# Patient Record
Sex: FEMALE | Race: WHITE | NOT HISPANIC OR LATINO | Employment: OTHER | URBAN - NONMETROPOLITAN AREA
[De-identification: names, ages, dates, MRNs, and addresses within clinical notes are randomized per-mention and may not be internally consistent; named-entity substitution may affect disease eponyms.]

---

## 2023-03-14 PROBLEM — M54.14 THORACIC RADICULOPATHY: Status: ACTIVE | Noted: 2023-03-14

## 2023-03-28 PROBLEM — I10 BENIGN ESSENTIAL HTN: Status: ACTIVE | Noted: 2023-03-28

## 2023-03-28 PROBLEM — E87.20 METABOLIC ACIDEMIA: Status: ACTIVE | Noted: 2023-03-28

## 2023-03-28 PROBLEM — F33.1 MDD (MAJOR DEPRESSIVE DISORDER), RECURRENT EPISODE, MODERATE: Status: ACTIVE | Noted: 2023-03-28

## 2023-03-28 PROBLEM — D72.829 LEUKOCYTOSIS: Status: ACTIVE | Noted: 2023-03-28

## 2023-03-28 PROBLEM — Z79.899 POLYPHARMACY: Status: ACTIVE | Noted: 2023-03-28

## 2024-03-21 ENCOUNTER — HOSPITAL ENCOUNTER (INPATIENT)
Facility: HOSPITAL | Age: 39
LOS: 2 days | Discharge: HOME | DRG: 883 | End: 2024-03-23
Attending: STUDENT IN AN ORGANIZED HEALTH CARE EDUCATION/TRAINING PROGRAM | Admitting: STUDENT IN AN ORGANIZED HEALTH CARE EDUCATION/TRAINING PROGRAM
Payer: MEDICARE

## 2024-03-21 DIAGNOSIS — R45.851 SUICIDAL IDEATIONS: ICD-10-CM

## 2024-03-21 PROCEDURE — 11400000 HC PSYCH PRIVATE ROOM

## 2024-03-21 PROCEDURE — 25000003 PHARM REV CODE 250: Performed by: PSYCHIATRY & NEUROLOGY

## 2024-03-21 RX ORDER — HYDROXYZINE PAMOATE 50 MG/1
50 CAPSULE ORAL NIGHTLY PRN
Status: DISCONTINUED | OUTPATIENT
Start: 2024-03-21 | End: 2024-03-22

## 2024-03-21 RX ORDER — ACETAMINOPHEN 325 MG/1
650 TABLET ORAL EVERY 6 HOURS PRN
Status: DISCONTINUED | OUTPATIENT
Start: 2024-03-21 | End: 2024-03-22

## 2024-03-21 RX ADMIN — ACETAMINOPHEN 650 MG: 325 TABLET ORAL at 10:03

## 2024-03-21 RX ADMIN — HYDROXYZINE PAMOATE 50 MG: 50 CAPSULE ORAL at 10:03

## 2024-03-22 PROBLEM — R45.851 SUICIDAL IDEATIONS: Status: ACTIVE | Noted: 2024-03-22

## 2024-03-22 PROBLEM — E66.01 CLASS 3 SEVERE OBESITY DUE TO EXCESS CALORIES WITHOUT SERIOUS COMORBIDITY IN ADULT: Status: ACTIVE | Noted: 2024-03-22

## 2024-03-22 LAB
CHOLEST SERPL-MCNC: 187 MG/DL (ref 120–199)
CHOLEST/HDLC SERPL: 4.1 {RATIO} (ref 2–5)
ESTIMATED AVG GLUCOSE: 120 MG/DL (ref 68–131)
HBA1C MFR BLD: 5.8 % (ref 4–5.6)
HDLC SERPL-MCNC: 46 MG/DL (ref 40–75)
HDLC SERPL: 24.6 % (ref 20–50)
LDLC SERPL CALC-MCNC: 111.8 MG/DL (ref 63–159)
NONHDLC SERPL-MCNC: 141 MG/DL
TRIGL SERPL-MCNC: 146 MG/DL (ref 30–150)

## 2024-03-22 PROCEDURE — 25000003 PHARM REV CODE 250: Performed by: PSYCHIATRY & NEUROLOGY

## 2024-03-22 PROCEDURE — 36415 COLL VENOUS BLD VENIPUNCTURE: CPT | Performed by: PSYCHIATRY & NEUROLOGY

## 2024-03-22 PROCEDURE — 80061 LIPID PANEL: CPT | Performed by: PSYCHIATRY & NEUROLOGY

## 2024-03-22 PROCEDURE — 99223 1ST HOSP IP/OBS HIGH 75: CPT | Mod: ,,, | Performed by: PSYCHIATRY & NEUROLOGY

## 2024-03-22 PROCEDURE — 90836 PSYTX W PT W E/M 45 MIN: CPT | Mod: ,,, | Performed by: PSYCHIATRY & NEUROLOGY

## 2024-03-22 PROCEDURE — 25000003 PHARM REV CODE 250: Performed by: INTERNAL MEDICINE

## 2024-03-22 PROCEDURE — 83036 HEMOGLOBIN GLYCOSYLATED A1C: CPT | Performed by: PSYCHIATRY & NEUROLOGY

## 2024-03-22 PROCEDURE — 11400000 HC PSYCH PRIVATE ROOM

## 2024-03-22 RX ORDER — ONDANSETRON 4 MG/1
4 TABLET, ORALLY DISINTEGRATING ORAL EVERY 8 HOURS PRN
Status: DISCONTINUED | OUTPATIENT
Start: 2024-03-22 | End: 2024-03-23 | Stop reason: HOSPADM

## 2024-03-22 RX ORDER — OLANZAPINE 10 MG/2ML
10 INJECTION, POWDER, FOR SOLUTION INTRAMUSCULAR EVERY 8 HOURS PRN
Status: DISCONTINUED | OUTPATIENT
Start: 2024-03-22 | End: 2024-03-23 | Stop reason: HOSPADM

## 2024-03-22 RX ORDER — BENZTROPINE MESYLATE 1 MG/ML
2 INJECTION, SOLUTION INTRAMUSCULAR; INTRAVENOUS EVERY 8 HOURS PRN
Status: DISCONTINUED | OUTPATIENT
Start: 2024-03-22 | End: 2024-03-23 | Stop reason: HOSPADM

## 2024-03-22 RX ORDER — ACETAMINOPHEN 325 MG/1
650 TABLET ORAL EVERY 6 HOURS PRN
Status: DISCONTINUED | OUTPATIENT
Start: 2024-03-22 | End: 2024-03-23 | Stop reason: HOSPADM

## 2024-03-22 RX ORDER — OXYCODONE AND ACETAMINOPHEN 7.5; 325 MG/1; MG/1
1 TABLET ORAL 2 TIMES DAILY
Status: DISCONTINUED | OUTPATIENT
Start: 2024-03-22 | End: 2024-03-23 | Stop reason: HOSPADM

## 2024-03-22 RX ORDER — LOPERAMIDE HYDROCHLORIDE 2 MG/1
2 CAPSULE ORAL
Status: DISCONTINUED | OUTPATIENT
Start: 2024-03-22 | End: 2024-03-23 | Stop reason: HOSPADM

## 2024-03-22 RX ORDER — GABAPENTIN 300 MG/1
300 CAPSULE ORAL 3 TIMES DAILY
COMMUNITY
Start: 2024-03-13

## 2024-03-22 RX ORDER — ALUMINUM HYDROXIDE, MAGNESIUM HYDROXIDE, AND SIMETHICONE 1200; 120; 1200 MG/30ML; MG/30ML; MG/30ML
30 SUSPENSION ORAL EVERY 6 HOURS PRN
Status: DISCONTINUED | OUTPATIENT
Start: 2024-03-22 | End: 2024-03-23 | Stop reason: HOSPADM

## 2024-03-22 RX ORDER — GABAPENTIN 300 MG/1
300 CAPSULE ORAL 3 TIMES DAILY
Status: DISCONTINUED | OUTPATIENT
Start: 2024-03-22 | End: 2024-03-23 | Stop reason: HOSPADM

## 2024-03-22 RX ORDER — HYDROXYZINE PAMOATE 50 MG/1
50 CAPSULE ORAL EVERY 6 HOURS PRN
Status: DISCONTINUED | OUTPATIENT
Start: 2024-03-22 | End: 2024-03-23 | Stop reason: HOSPADM

## 2024-03-22 RX ORDER — IBUPROFEN 200 MG
1 TABLET ORAL DAILY PRN
Status: DISCONTINUED | OUTPATIENT
Start: 2024-03-22 | End: 2024-03-23 | Stop reason: HOSPADM

## 2024-03-22 RX ORDER — OXYCODONE AND ACETAMINOPHEN 7.5; 325 MG/1; MG/1
1 TABLET ORAL 2 TIMES DAILY PRN
COMMUNITY
Start: 2024-03-15 | End: 2024-05-11

## 2024-03-22 RX ORDER — MULTIVITAMIN
1 TABLET ORAL DAILY
COMMUNITY

## 2024-03-22 RX ORDER — OLANZAPINE 10 MG/1
10 TABLET ORAL EVERY 8 HOURS PRN
Status: DISCONTINUED | OUTPATIENT
Start: 2024-03-22 | End: 2024-03-23 | Stop reason: HOSPADM

## 2024-03-22 RX ORDER — CLONIDINE HYDROCHLORIDE 0.1 MG/1
0.1 TABLET ORAL DAILY
Status: ON HOLD | COMMUNITY
End: 2024-03-22

## 2024-03-22 RX ORDER — CYCLOBENZAPRINE HCL 5 MG
20 TABLET ORAL 3 TIMES DAILY PRN
Status: DISCONTINUED | OUTPATIENT
Start: 2024-03-22 | End: 2024-03-23 | Stop reason: HOSPADM

## 2024-03-22 RX ORDER — BENZONATATE 100 MG/1
100 CAPSULE ORAL 3 TIMES DAILY PRN
Status: DISCONTINUED | OUTPATIENT
Start: 2024-03-22 | End: 2024-03-23 | Stop reason: HOSPADM

## 2024-03-22 RX ORDER — IBUPROFEN 400 MG/1
800 TABLET ORAL 3 TIMES DAILY
Status: DISCONTINUED | OUTPATIENT
Start: 2024-03-22 | End: 2024-03-23 | Stop reason: HOSPADM

## 2024-03-22 RX ORDER — PROMETHAZINE HYDROCHLORIDE 25 MG/1
25 TABLET ORAL EVERY 6 HOURS PRN
Status: DISCONTINUED | OUTPATIENT
Start: 2024-03-22 | End: 2024-03-23 | Stop reason: HOSPADM

## 2024-03-22 RX ORDER — IBUPROFEN 800 MG/1
800 TABLET ORAL 3 TIMES DAILY
COMMUNITY
Start: 2024-02-28

## 2024-03-22 RX ADMIN — CYCLOBENZAPRINE HYDROCHLORIDE 20 MG: 5 TABLET, FILM COATED ORAL at 05:03

## 2024-03-22 RX ADMIN — OXYCODONE AND ACETAMINOPHEN 1 TABLET: 7.5; 325 TABLET ORAL at 10:03

## 2024-03-22 RX ADMIN — GABAPENTIN 300 MG: 300 CAPSULE ORAL at 08:03

## 2024-03-22 RX ADMIN — IBUPROFEN 800 MG: 400 TABLET, FILM COATED ORAL at 08:03

## 2024-03-22 RX ADMIN — OXYCODONE AND ACETAMINOPHEN 1 TABLET: 7.5; 325 TABLET ORAL at 08:03

## 2024-03-22 RX ADMIN — ACETAMINOPHEN 650 MG: 325 TABLET ORAL at 08:03

## 2024-03-22 RX ADMIN — GABAPENTIN 300 MG: 300 CAPSULE ORAL at 03:03

## 2024-03-22 NOTE — PLAN OF CARE
03/22/24 1450   Step 1: Warning Signs   Warning Sign 1 anxiety/anxiety attack   Warning Sign 2 chroni pain   Warning Sign 3 being sedated   Step 2: Internal coping strategies - Things I can do to take my mind off my problems without contacting another person:   Coping Strategy 1 crotchet   Coping Strategy 2 build minature model homes   Coping Strategy 3 n/a   Step 3: People and social settings that provide distraction:   1. Name My Kids (abdi, nithya, sherry, bardales)       Phone n/a   2. Name severo Sharma'       Phone [phone]   3. Place my porch   4. Place n/a    Step 4: People whom I can ask for help:   1. Person Soco Villegas (fiance)       Phone [phone]   2. Person Betty (mother)       Phone [phone]   3. Person Juanita (severo' mother)   Step 5: Professionals or agencies I can contact during a crisis:   1. Clinician Name [facility]       Phone [phone]   3. Suicide Prevention Lifeline: 988 Suicide Prevention Lifeline 988   4. [facility] Emergency [facility]       911       Emergency Services Phone warm line [phone] wed-sun 5pm-10pm   Step 6: Making the environment safer (plan for lethal means safety)   Safe Environment Plan seek help   Safe Environment Optional: What is most important to me and worth living for? my kids   Safety Plan Tasks   Provided a Hard Copy to the Patient Y   Explained How to Follow the Steps Y   Discussed Facilitators and Barriers Y
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
## 2024-03-22 NOTE — H&P
[facility] Hospital Medicine  History & Physical    Patient Name: Nancy Perez  MRN: [id number]  Patient Class: IP- Psych  Admission Date: 3/21/2024  Attending Physician: Costa Bermudez MD   Primary Care Provider: Meeks, Claude H., MD         Patient information was obtained from ER records.     Subjective:     Principal Problem:Suicidal ideations    Chief Complaint: No chief complaint on file.       HPI: Patient presents for chief complaint of suicidal ideation.     Past Medical History:   Diagnosis Date    ADD (attention deficit disorder)     Anxiety disorder, unspecified     Asthma     Sport Induced    Bipolar disorder, unspecified     Degenerative disc disease, lumbar     L4,L5,S1    Depression     Lumbar herniated disc     OCD (obsessive compulsive disorder)     PTSD (post-traumatic stress disorder)     Sciatic nerve injury     Bilateral    Stroke     Ischemic Stroke at 28       Past Surgical History:   Procedure Laterality Date    BACK SURGERY      HARDWARE REMOVAL      Lumbar Fusion Removed    LAMINECTOMY AND MICRODISCECTOMY LUMBAR SPINE      LUMBAR FUSION      L4,L5,S1    MYELOGRAPHY N/A 8/4/2023    Procedure: MYELOGRAM;  Surgeon: Provider, [facility];  [facility];  Service: General;  Laterality: N/A;    RELOCATION, SKIN POCKET, FOR IMPLANTABLE DEVICE Right 3/28/2023    Procedure: RELOCATION, SKIN POCKET, FOR IMPLANTABLE DEVICE;  Surgeon: Maury Stinson Jr., MD;  [facility];  Service: Orthopedics;  Laterality: Right;    REPLACEMENT OF SPINAL CORD STIMULATOR      REVISION, IMPLANTED DEVICE Bilateral 3/28/2023    Procedure: REVISION, IMPLANTED DEVICE, bilateral spinal cord removal. replace with spinal cord stimulator;  Surgeon: Maury Stinson Jr., MD;  [facility];  Service: Orthopedics;  Laterality: Bilateral;  Angelo w/ [employer]    SPINAL CORD STIMULATOR IMPLANT      TRIAL OF SPINAL CORD NERVE STIMULATOR      TUBAL LIGATION          Review of patient's allergies indicates:   Allergen Reactions    Azithromycin Anaphylaxis    Benadryl [diphenhydramine hcl] Shortness Of Breath    Coconut Anaphylaxis    Lyrica [pregabalin] Anaphylaxis    Naproxen Shortness Of Breath and Rash    Pcn [penicillins] Anaphylaxis    Adhesive Blisters     Use Paper tape    Toradol [ketorolac]       Arm Locks up after  IM injection     Iodinated contrast media Rash    Latex, natural rubber Hives and Other (See Comments)       No current facility-administered medications on file prior to encounter.     Current Outpatient Medications on File Prior to Encounter   Medication Sig    cyclobenzaprine (FLEXERIL) 10 MG tablet Take 20 mg by mouth 3 (three) times daily as needed for Muscle spasms.    gabapentin (NEURONTIN) 300 MG capsule Take 300 mg by mouth 3 (three) times daily.    ibuprofen (ADVIL,MOTRIN) 800 MG tablet Take 800 mg by mouth 3 (three) times daily.    multivitamin (ONE DAILY MULTIVITAMIN) per tablet Take 1 tablet by mouth once daily.    oxyCODONE-acetaminophen (PERCOCET) 7.5-325 mg per tablet Take 1 tablet by mouth 2 (two) times daily as needed for Pain.    [DISCONTINUED] cloNIDine (CATAPRES) 0.1 MG tablet Take 0.1 mg by mouth once daily.    NON FORMULARY MEDICATION 95725 UNIT Vitamin D3 Every Wednesday    [DISCONTINUED] EScitalopram oxalate (LEXAPRO) 20 MG tablet Take 20 mg by mouth once daily.    [DISCONTINUED] pravastatin (PRAVACHOL) 10 MG tablet Take 10 mg by mouth once daily.     Family History       Problem Relation (Age of Onset)    Clotting disorder Mother    Depression Mother, Father    Diabetes Mother, Father, Maternal Grandmother, Paternal Grandmother    Hypertension Mother    Lumbar disc disease Father          Tobacco Use    Smoking status: Every Day     Current packs/day: 0.50     Types: Cigarettes    Smokeless tobacco: Never   Substance and Sexual Activity    Alcohol use: Not Currently    Drug use: Never    Sexual activity: Yes     Partners: Male      Review of Systems   Constitutional:  Negative for fatigue and fever.   HENT:  Negative for congestion, ear pain and sore throat.    Eyes:  Negative for pain and discharge.   Respiratory:  Negative for cough, shortness of breath and wheezing.    Gastrointestinal:  Negative for abdominal pain, constipation, diarrhea, nausea and vomiting.   Endocrine: Negative for cold intolerance and heat intolerance.   Genitourinary:  Negative for difficulty urinating, dysuria and frequency.   Musculoskeletal:  Negative for arthralgias.   Allergic/Immunologic: Negative for environmental allergies.   Neurological:  Negative for dizziness, tremors and seizures.   Psychiatric/Behavioral:  Positive for behavioral problems and suicidal ideas.    All other systems reviewed and are negative.    Objective:     Vital Signs (Most Recent):  Temp: 97.9 °F (36.6 °C) (03/22/24 0728)  Pulse: 97 (03/22/24 0728)  Resp: 18 (03/22/24 1059)  BP: (!) 147/73 (03/22/24 0728)  SpO2: 98 % (03/22/24 0728) Vital Signs (24h Range):  Temp:  [97.9 °F (36.6 °C)-98 °F (36.7 °C)] 97.9 °F (36.6 °C)  Pulse:  [95-97] 97  Resp:  [16-20] 18  SpO2:  [98 %-99 %] 98 %  BP: (147-163)/(73-93) 147/73     Weight: 104.3 kg (230 lb)  Body mass index is 40.74 kg/m².     Physical Exam  Vitals and nursing note reviewed.   Constitutional:       Appearance: Normal appearance. She is obese.   HENT:      Head: Normocephalic and atraumatic.      Nose: Nose normal.      Mouth/Throat:      Mouth: Mucous membranes are moist.      Pharynx: Oropharynx is clear.   Eyes:      Extraocular Movements: Extraocular movements intact.      Conjunctiva/sclera: Conjunctivae normal.      Pupils: Pupils are equal, round, and reactive to light.   Cardiovascular:      Rate and Rhythm: Normal rate and regular rhythm.      Pulses: Normal pulses.      Heart sounds: Normal heart sounds.   Pulmonary:      Effort: Pulmonary effort is normal.      Breath sounds: Normal breath sounds.   Abdominal:      General:  Abdomen is flat. Bowel sounds are normal.      Palpations: Abdomen is soft.   Musculoskeletal:         General: Normal range of motion.      Cervical back: Normal range of motion and neck supple.   Skin:     General: Skin is warm and dry.      Capillary Refill: Capillary refill takes less than 2 seconds.      Comments: No rashes on limited skin exam.   Neurological:      General: No focal deficit present.      Mental Status: She is alert and oriented to person, place, and time.      Cranial Nerves: No cranial nerve deficit.      Comments: I Olfactory:  Sense of smell intact    II Optic:  Pupils equal round react to light.  Vision intact.    III, IV, VI, Ocular motor, Trochlear, Abducens:  Extraocular movements intact    V Trigeminal:  Facial sensation intact facial sensation intact,, muscles of mastication intact muscles of mastication intact, corneal reflex intact, corneal reflex intact    VII Facial:  Muscles of facial expression intact     VIII Vestibular cochlear: Hearing intact vestibular cochlear: Hearing intact    IX Glossopharyngeal:  Gag reflex intact.  Tasting intact.     X Vagus:  Gag reflex intact.    XI Spinal Accessory:  Shoulder shrug intact.  Head rotation intact.    XII Hypoglossal:  Tongue movements intact.     Psychiatric:         Mood and Affect: Mood is depressed.         Thought Content: Thought content includes suicidal ideation.         Judgment: Judgment is inappropriate.      Comments: Patient appears depressed              CRANIAL NERVES     CN III, IV, VI   Pupils are equal, round, and reactive to light.       Significant Labs: All pertinent labs within the past 24 hours have been reviewed.    Significant Imaging: I have reviewed all pertinent imaging results/findings within the past 24 hours.  Assessment/Plan:     * Suicidal ideations  To be admitted to our inpatient psychiatric unit for further evaluation and management.        Class 3 severe obesity due to excess calories without  serious comorbidity in adult  Body mass index is 40.74 kg/m². Morbid obesity complicates all aspects of disease management from diagnostic modalities to treatment. Weight loss encouraged and health benefits explained to patient.           VTE Risk Mitigation (From admission, onward)      None                            Emmanuel Vogel Jr, MD  Department of Hospital Medicine  [facility] ([facility])

## 2024-03-22 NOTE — ASSESSMENT & PLAN NOTE
Body mass index is 40.74 kg/m². Morbid obesity complicates all aspects of disease management from diagnostic modalities to treatment. Weight loss encouraged and health benefits explained to patient.

## 2024-03-22 NOTE — SUBJECTIVE & OBJECTIVE
Past Medical History:   Diagnosis Date    ADD (attention deficit disorder)     Anxiety disorder, unspecified     Asthma     Sport Induced    Bipolar disorder, unspecified     Degenerative disc disease, lumbar     L4,L5,S1    Depression     Lumbar herniated disc     OCD (obsessive compulsive disorder)     PTSD (post-traumatic stress disorder)     Sciatic nerve injury     Bilateral    Stroke     Ischemic Stroke at 28       Past Surgical History:   Procedure Laterality Date    BACK SURGERY      HARDWARE REMOVAL      Lumbar Fusion Removed    LAMINECTOMY AND MICRODISCECTOMY LUMBAR SPINE      LUMBAR FUSION      L4,L5,S1    MYELOGRAPHY N/A 8/4/2023    Procedure: MYELOGRAM;  Surgeon: Provider, [facility];  Location: [facility] OR;  Service: General;  Laterality: N/A;    RELOCATION, SKIN POCKET, FOR IMPLANTABLE DEVICE Right 3/28/2023    Procedure: RELOCATION, SKIN POCKET, FOR IMPLANTABLE DEVICE;  Surgeon: Maury Stinson Jr., MD;  Location: [facility] OR;  Service: Orthopedics;  Laterality: Right;    REPLACEMENT OF SPINAL CORD STIMULATOR      REVISION, IMPLANTED DEVICE Bilateral 3/28/2023    Procedure: REVISION, IMPLANTED DEVICE, bilateral spinal cord removal. replace with spinal cord stimulator;  Surgeon: Maury Stisnon Jr., MD;  Location: [facility] OR;  Service: Orthopedics;  Laterality: Bilateral;  Angelo w/ [employer]    SPINAL CORD STIMULATOR IMPLANT      TRIAL OF SPINAL CORD NERVE STIMULATOR      TUBAL LIGATION         Review of patient's allergies indicates:   Allergen Reactions    Azithromycin Anaphylaxis    Benadryl [diphenhydramine hcl] Shortness Of Breath    Coconut Anaphylaxis    Lyrica [pregabalin] Anaphylaxis    Naproxen Shortness Of Breath and Rash    Pcn [penicillins] Anaphylaxis    Adhesive Blisters     Use Paper tape    Toradol [ketorolac]       Arm Locks up after  IM injection     Iodinated contrast media Rash    Latex, natural rubber Hives and Other (See Comments)       No current facility-administered  medications on file prior to encounter.     Current Outpatient Medications on File Prior to Encounter   Medication Sig    cyclobenzaprine (FLEXERIL) 10 MG tablet Take 20 mg by mouth 3 (three) times daily as needed for Muscle spasms.    gabapentin (NEURONTIN) 300 MG capsule Take 300 mg by mouth 3 (three) times daily.    ibuprofen (ADVIL,MOTRIN) 800 MG tablet Take 800 mg by mouth 3 (three) times daily.    multivitamin (ONE DAILY MULTIVITAMIN) per tablet Take 1 tablet by mouth once daily.    oxyCODONE-acetaminophen (PERCOCET) 7.5-325 mg per tablet Take 1 tablet by mouth 2 (two) times daily as needed for Pain.    [DISCONTINUED] cloNIDine (CATAPRES) 0.1 MG tablet Take 0.1 mg by mouth once daily.    NON FORMULARY MEDICATION 21671 UNIT Vitamin D3 Every Wednesday    [DISCONTINUED] EScitalopram oxalate (LEXAPRO) 20 MG tablet Take 20 mg by mouth once daily.    [DISCONTINUED] pravastatin (PRAVACHOL) 10 MG tablet Take 10 mg by mouth once daily.     Family History       Problem Relation (Age of Onset)    Clotting disorder Mother    Depression Mother, Father    Diabetes Mother, Father, Maternal Grandmother, Paternal Grandmother    Hypertension Mother    Lumbar disc disease Father          Tobacco Use    Smoking status: Every Day     Current packs/day: 0.50     Types: Cigarettes    Smokeless tobacco: Never   Substance and Sexual Activity    Alcohol use: Not Currently    Drug use: Never    Sexual activity: Yes     Partners: Male     Review of Systems   Constitutional:  Negative for fatigue and fever.   HENT:  Negative for congestion, ear pain and sore throat.    Eyes:  Negative for pain and discharge.   Respiratory:  Negative for cough, shortness of breath and wheezing.    Gastrointestinal:  Negative for abdominal pain, constipation, diarrhea, nausea and vomiting.   Endocrine: Negative for cold intolerance and heat intolerance.   Genitourinary:  Negative for difficulty urinating, dysuria and frequency.   Musculoskeletal:  Negative  for arthralgias.   Allergic/Immunologic: Negative for environmental allergies.   Neurological:  Negative for dizziness, tremors and seizures.   Psychiatric/Behavioral:  Positive for behavioral problems and suicidal ideas.    All other systems reviewed and are negative.    Objective:     Vital Signs (Most Recent):  Temp: 97.9 °F (36.6 °C) (03/22/24 0728)  Pulse: 97 (03/22/24 0728)  Resp: 18 (03/22/24 1059)  BP: (!) 147/73 (03/22/24 0728)  SpO2: 98 % (03/22/24 0728) Vital Signs (24h Range):  Temp:  [97.9 °F (36.6 °C)-98 °F (36.7 °C)] 97.9 °F (36.6 °C)  Pulse:  [95-97] 97  Resp:  [16-20] 18  SpO2:  [98 %-99 %] 98 %  BP: (147-163)/(73-93) 147/73     Weight: 104.3 kg (230 lb)  Body mass index is 40.74 kg/m².     Physical Exam  Vitals and nursing note reviewed.   Constitutional:       Appearance: Normal appearance. She is obese.   HENT:      Head: Normocephalic and atraumatic.      Nose: Nose normal.      Mouth/Throat:      Mouth: Mucous membranes are moist.      Pharynx: Oropharynx is clear.   Eyes:      Extraocular Movements: Extraocular movements intact.      Conjunctiva/sclera: Conjunctivae normal.      Pupils: Pupils are equal, round, and reactive to light.   Cardiovascular:      Rate and Rhythm: Normal rate and regular rhythm.      Pulses: Normal pulses.      Heart sounds: Normal heart sounds.   Pulmonary:      Effort: Pulmonary effort is normal.      Breath sounds: Normal breath sounds.   Abdominal:      General: Abdomen is flat. Bowel sounds are normal.      Palpations: Abdomen is soft.   Musculoskeletal:         General: Normal range of motion.      Cervical back: Normal range of motion and neck supple.   Skin:     General: Skin is warm and dry.      Capillary Refill: Capillary refill takes less than 2 seconds.      Comments: No rashes on limited skin exam.   Neurological:      General: No focal deficit present.      Mental Status: She is alert and oriented to person, place, and time.      Cranial Nerves: No  cranial nerve deficit.      Comments: I Olfactory:  Sense of smell intact    II Optic:  Pupils equal round react to light.  Vision intact.    III, IV, VI, Ocular motor, Trochlear, Abducens:  Extraocular movements intact    V Trigeminal:  Facial sensation intact facial sensation intact,, muscles of mastication intact muscles of mastication intact, corneal reflex intact, corneal reflex intact    VII Facial:  Muscles of facial expression intact     VIII Vestibular cochlear: Hearing intact vestibular cochlear: Hearing intact    IX Glossopharyngeal:  Gag reflex intact.  Tasting intact.     X Vagus:  Gag reflex intact.    XI Spinal Accessory:  Shoulder shrug intact.  Head rotation intact.    XII Hypoglossal:  Tongue movements intact.     Psychiatric:         Mood and Affect: Mood is depressed.         Thought Content: Thought content includes suicidal ideation.         Judgment: Judgment is inappropriate.      Comments: Patient appears depressed              CRANIAL NERVES     CN III, IV, VI   Pupils are equal, round, and reactive to light.       Significant Labs: All pertinent labs within the past 24 hours have been reviewed.    Significant Imaging: I have reviewed all pertinent imaging results/findings within the past 24 hours.

## 2024-03-22 NOTE — NURSING
Patient 38-years old with psychiatric history of Bipolar Disorder, PTSD, Depression, Anxiety, OCD,  and history of psychiatric hospitalization went to [facility]( [facility]) ED for assistance with anxiety.  Patient expressed that she had gone to her orthopedic appointment and had pain shots injected in her lower back on 3-21-24.  Patient indicated that typically when she has the pain shots, she has a history of having extreme anxiety.  Patient presented to the ED and said she was suicidal and did not want to live anymore.  Patient is blaming everything on the injections and expressed that she no longer feels this way.  UDS negative.  Patient has a history of greater back injuries and reports 19 back surgeries.  Patient expressed that she has to have her pain medication to cope.  No other concerns.

## 2024-03-22 NOTE — NURSING
Pt ok'd to use her spinal stimulator by dr brown. Stimulator has wires and a arlin pack that holds it in place.  Dr cullen also notified and pt placed on a line of sight. Pt states she does not use the stimulator at night so she will be a line of sight when in use.  Pt aware.  Staff aware.  Pt in activity room at this time sitting in wc.  Pt instructed to call for any needs or concerns at any time.  Will cont to monitor for safety. Patient care ongoing.

## 2024-03-22 NOTE — PLAN OF CARE
Behavioral Health Unit  Psychosocial History and Assessment  Progress Note      Patient Name: Nancy Perez YOB: 1985 SW: YUNG Brown  Date: 3/22/2024    Chief Complaint: depression, suicidal ideation, and anxiety    Consent:     Did the patient consent for an interview with the ? Yes    Did the patient consent for the  to contact family/friend/caregiver?   Yes  Name: Soco Villegas, Relationship: fiance', and Contact: [phone]    Did the patient give consent for the  to inform family/friend/caregiver of his/her whereabouts or to discuss discharge planning? Yes    Source of Information: Face to face with patient and Telephone interview with family/friend/caregiver    Is information obtained from interviews considered reliable?   yes    Reason for Admission:     Active Hospital Problems    Diagnosis  POA    *Suicidal ideations [R45.851]  Not Applicable    Class 3 severe obesity due to excess calories without serious comorbidity in adult [E66.01]  Unknown      Resolved Hospital Problems   No resolved problems to display.       History of Present Illness - (Patient Perception):   I had two injections in my tailbone I'm having and adverse reaction to the injections that led to racing thoughts, anxiety attack, and  suicidal thoughts because the pain was so bad so I came for help.     History of Present Illness - (Perception of Others):She broke her back years ago. She has had numerous back surgeries. She is in pain majority of the time. She is 0 to 100 type person there is no middle and when she becomes that way she has to get away. She was dx with PTSD and OCD. She felt like she wasn't getting any help from anyone. She needed help with the baby her parents wouldn't help. Yesterday she had spinal injection, she's suffering with pain from the injections on top of back pain according to Soco Villegas     Present biopsychosocial functioning: Per  MD Note, Pt is a 38 y.o. female with a past psychiatric history of Bipolar disorder, anxiety, PTSD and chronic pain currently admitted to the inpatient unit with the following chief complaint: depression/SI/anxiety. Pt currently denies SI/HI/AVH. Pt UDS was negative. Pt lives in home with significant other and younger children. Pt is disabled. Pt reports daily chronic pain. Pt reports history of back surgeries. Pt reports monthly outpatient psychiatric and therapeutic service at [facility].Pt reports most recent stressor as receiving two injections and experiencing adverse side effects.     Past biopsychosocial functioning: Pt reports 1 previous inpatient treatment at [facility] in 2017 for suicide attempt. Pt reports past traumas.     Family and Marital/Relationship History:     Significant Other/Partner Relationships:  Partnered: Relationship strained    Family Relationships: Intact      Childhood History:     Where was patient raised? [place]     Who raised the patient? Parents       How does patient describe their childhood? Good       Who is patient's primary support person? Soco Gonzales       Culture and Episcopalian:     Episcopalian: No Episcopalian    How strong of a role does Yazidism and spirituality play in patient's life? None    Jain or spiritual concerns regarding treatment: not applicable     History of Abuse:   History of Abuse: Denies      Psychiatric and Medical History:     History of psychiatric illness or treatment: prior inpatient treatment, prior suicide attempt(s), has participated in counseling/psychotherapy on an outpatient basis in the past, and currently under psychiatric care    Medical history:   Past Medical History:   Diagnosis Date    ADD (attention deficit disorder)     Anxiety disorder, unspecified     Asthma     Sport Induced    Bipolar disorder, unspecified     Degenerative disc disease, lumbar     L4,L5,S1    Depression     Lumbar herniated disc     OCD (obsessive compulsive disorder)      PTSD (post-traumatic stress disorder)     Sciatic nerve injury     Bilateral    Stroke     Ischemic Stroke at 28       Substance Abuse History:     Alcohol - (Patient Perspective):   Social History     Substance and Sexual Activity   Alcohol Use Not Currently       Alcohol - (Collateral Perspective): rare use  according to Soco Villegas     Drugs - (Patient Perspective):   Social History     Substance and Sexual Activity   Drug Use Never       Drugs - (Collateral Perspective): None according to Soco Villegas    Education:     Currently Enrolled? No  Associate/Bachelor Degree    Special Education? No    Interested in Completing Education/GED: No    Employment and Financial:     Currently employed? Disabled     Source of Income: SSI    Able to afford basic needs (food, shelter, utilities)? Yes    Who manages finances/personal affairs? Self       Service:     ? no    Combat Service? No     Community Resources:     Describe present use of community resources: [facility]      Identify previously used community resources   (Include previous mental health treatment - outpatient and inpatient): Pt reports 1 previous inpatient treatment at [facility] in 2017.    Environmental:     Current living situation:Lives with family, Lives in home    Social Evaluation:     Patient Assets: General fund of knowledge and Communicable skills    Patient Limitations: disabled, chronic pain     High risk psychosocial issues that may impact discharge planning:   Pain management     Recommendations:     Anticipated discharge plan:   outpatient follow up, home with family     High risk issues requiring early treatment planning and immediate intervention: depression, suicidal ideation, and anxiety    Community resources needed for discharge planning:  aftercare treatment sources    Anticipated social work role(s) in treatment and discharge planning: SW will facilitate process groups to assist pt develop healthy coping skills; CM will  arrange outpatient follow-up appointments and assist with discharge planning.

## 2024-03-23 VITALS
WEIGHT: 230 LBS | DIASTOLIC BLOOD PRESSURE: 78 MMHG | OXYGEN SATURATION: 98 % | RESPIRATION RATE: 18 BRPM | TEMPERATURE: 98 F | HEIGHT: 63 IN | HEART RATE: 80 BPM | BODY MASS INDEX: 40.75 KG/M2 | SYSTOLIC BLOOD PRESSURE: 148 MMHG

## 2024-03-23 PROBLEM — R45.851 SUICIDAL IDEATIONS: Status: RESOLVED | Noted: 2024-03-22 | Resolved: 2024-03-23

## 2024-03-23 PROBLEM — F39 MOOD DISORDER: Status: ACTIVE | Noted: 2024-03-23

## 2024-03-23 PROCEDURE — 90833 PSYTX W PT W E/M 30 MIN: CPT | Mod: ,,, | Performed by: PSYCHIATRY & NEUROLOGY

## 2024-03-23 PROCEDURE — 25000003 PHARM REV CODE 250: Performed by: PSYCHIATRY & NEUROLOGY

## 2024-03-23 PROCEDURE — 25000003 PHARM REV CODE 250: Performed by: INTERNAL MEDICINE

## 2024-03-23 PROCEDURE — 99239 HOSP IP/OBS DSCHRG MGMT >30: CPT | Mod: ,,, | Performed by: PSYCHIATRY & NEUROLOGY

## 2024-03-23 RX ADMIN — CYCLOBENZAPRINE HYDROCHLORIDE 20 MG: 5 TABLET, FILM COATED ORAL at 07:03

## 2024-03-23 RX ADMIN — OXYCODONE AND ACETAMINOPHEN 1 TABLET: 7.5; 325 TABLET ORAL at 08:03

## 2024-03-23 RX ADMIN — IBUPROFEN 800 MG: 400 TABLET, FILM COATED ORAL at 08:03

## 2024-03-23 RX ADMIN — GABAPENTIN 300 MG: 300 CAPSULE ORAL at 08:03

## 2024-03-23 NOTE — NURSING
Pt to nurses's station @ 0637.  Pt requested 'something' for pain.  Pt was redirected by night shift nurse to request something after shift change.      Pt to nurses's station at 0720 to inquire about pain medication.  Treatment plan discussed.  Pt informed that her scheduled medications are due at 9 am and the earliest she can have them is 0801.  Pt informed that she has PRN flexeril, which she can have at this time.  See emar for details.     Pt irritable, histronic, states that it has been more than 12 hours since she last got medications.  Please see emar, last dose was not more than 12 hours ago.  Pt demanded to speak to patient advocate.  Pt stated that she had called the number and left repeated messages yesterday and no one had returned her call.     Pt encouraged to rest quietly while allow the flexeril to work and assured staff has no intention of withholding her medications once she can have them.  Pt also encouraged to discuss medications concerns with her provider.

## 2024-03-23 NOTE — NURSING
AVS reviewed with patient.  All questions answered.  Pt verbalized understanding.  Pt belongings verified and signed for per pt and tech.  Pt left unit in stable condition with U staff x 1.   Pt escorted to her significant other's car.

## 2024-03-23 NOTE — DISCHARGE INSTRUCTIONS
Follow up with your personal therapist at your regular scheduled  standing appointment every Thursday

## 2024-03-23 NOTE — NURSING
Pt in to see Dr Bermudez.  Discharge orders noted. Pt instructed to notify her fiancee for a ride home and that she will be ready for discharge at noon.  States he will be here at that time for transport home. Will cont to monitor for safety.

## 2024-03-23 NOTE — PLAN OF CARE
Pt was encouraged to attend group, but refused. Pt was offered 1:1, but refused.  Staff will continue to encourage pt to participate in process groups to verbalize feelings and develop healthy coping skills.

## 2024-03-23 NOTE — PLAN OF CARE
Pt. Is awake, alert and oriented x 4. She was noticed to be sitting in wheelchair and then in bed resting. Pt. Denied any c/o auditory or visual hallucinations. She also denied any c/o suicidal or homicidal ideations. She stated that she remains depressed and anxious on a scale of 1-10, she rated both at a 8. She was encouraged to verbalize her feelings. Will cont. To monitor Pt.

## 2024-03-23 NOTE — PROGRESS NOTES
Psychotherapy:  Target symptoms: anxiety , mood disorder  Why chosen therapy is appropriate versus another modality: relevant to diagnosis, patient responds to this modality, evidence based practice  Outcome monitoring methods: self-report, observation  Therapeutic intervention type: insight oriented psychotherapy, behavior modifying psychotherapy, supportive psychotherapy, interactive psychotherapy  Topics discussed/themes: building skills sets for symptom management, symptom recognition  Safety planning and wrap up session  The patient's response to the intervention is accepting. The patient's progress toward treatment goals is good   Duration of intervention: 18 minutes.    Costa Bermudez MD  Psychiatry

## 2024-03-23 NOTE — NURSING
Pt states that she will try to eat her breakfast tray after an hour, once her medications have had time to work.  Pt states that she can not eat while she is in pain or she is afraid she will vomit.
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
## 2024-05-01 PROBLEM — M54.16 LUMBAR RADICULOPATHY: Status: ACTIVE | Noted: 2024-05-01

## 2024-05-07 PROBLEM — M46.1 SACROILIITIS: Status: ACTIVE | Noted: 2024-05-07
